# Patient Record
Sex: FEMALE | Race: WHITE | NOT HISPANIC OR LATINO | URBAN - METROPOLITAN AREA
[De-identification: names, ages, dates, MRNs, and addresses within clinical notes are randomized per-mention and may not be internally consistent; named-entity substitution may affect disease eponyms.]

---

## 2020-01-28 ENCOUNTER — APPOINTMENT (OUTPATIENT)
Age: 50
Setting detail: DERMATOLOGY
End: 2020-03-23

## 2020-01-28 VITALS
HEART RATE: 84 BPM | HEIGHT: 65 IN | OXYGEN SATURATION: 97 % | SYSTOLIC BLOOD PRESSURE: 134 MMHG | WEIGHT: 181 LBS | RESPIRATION RATE: 17 BRPM | DIASTOLIC BLOOD PRESSURE: 75 MMHG

## 2020-01-28 DIAGNOSIS — Z80.3 FAMILY HISTORY OF MALIGNANT NEOPLASM OF BREAST: ICD-10-CM

## 2020-01-28 PROCEDURE — OTHER COUNSELING - BREAST CANCER (ONC): OTHER

## 2020-01-28 PROCEDURE — 99202 OFFICE O/P NEW SF 15 MIN: CPT

## 2020-01-28 ASSESSMENT — LOCATION DETAILED DESCRIPTION DERM
LOCATION DETAILED: RIGHT MEDIAL BREAST 3-4:00 REGION
LOCATION DETAILED: LEFT MEDIAL BREAST 9-10:00 REGION

## 2020-01-28 ASSESSMENT — LOCATION SIMPLE DESCRIPTION DERM
LOCATION SIMPLE: RIGHT BREAST
LOCATION SIMPLE: LEFT BREAST

## 2020-01-28 ASSESSMENT — LOCATION ZONE DERM: LOCATION ZONE: TRUNK

## 2020-01-28 NOTE — HPI: BREAST RECONSTRUCTION CONSULTATION
What Is Your Pre-Diagnosis Bra Size (Numeric)?: 38
How Severe Are Your Concerns?: mild
Is This A New Presentation, Or A Follow-Up?: Breast Reconstruction Consultation
Who Is Your Breast Surgeon (If Known)?: Dr. Collins
Who Referred You?: Dr. Fontenot

## 2020-06-01 ENCOUNTER — RX ONLY (RX ONLY)
Age: 50
End: 2020-06-01

## 2020-06-01 ENCOUNTER — APPOINTMENT (OUTPATIENT)
Age: 50
Setting detail: DERMATOLOGY
End: 2020-06-01

## 2020-06-01 DIAGNOSIS — Z42.1 ENCOUNTER FOR BREAST RECONSTRUCTION FOLLOWING MASTECTOMY: ICD-10-CM

## 2020-06-01 PROCEDURE — 99212 OFFICE O/P EST SF 10 MIN: CPT

## 2020-06-01 PROCEDURE — OTHER PRE-OP WORKLIST: OTHER

## 2020-06-01 PROCEDURE — OTHER COUNSELING - POSTMASTECTOMY BREAST RECONSTRUCTION: OTHER

## 2020-06-01 PROCEDURE — OTHER PHOTOS OBTAINED: OTHER

## 2020-06-01 NOTE — PROCEDURE: PHOTOS OBTAINED
Follow-Up For Additional Photos?: no
Detail Level: Detailed
Photographed Locations: Photos were obtained of the surgical site(s).
Follow-Up Increment: 1
Follow-Up Interval: year

## 2020-06-01 NOTE — PROCEDURE: PRE-OP WORKLIST
Surgery Scheduled: Bilateral direct to implant versus TE with alloderm; lifted smaller to a C-
Surgeon: Karina
Photos Taken?: yes
Admission Status: outpatient
Detail Level: Zone
Date Of Surgery: 6/29/20
Coordination With:: Dr. Fontenot

## 2020-07-06 ENCOUNTER — APPOINTMENT (OUTPATIENT)
Age: 50
Setting detail: DERMATOLOGY
End: 2020-07-07

## 2020-07-06 DIAGNOSIS — Z48.89 ENCOUNTER FOR OTHER SPECIFIED SURGICAL AFTERCARE: ICD-10-CM

## 2020-07-06 DIAGNOSIS — Z90.1 ACQUIRED ABSENCE OF BREAST AND NIPPLE: ICD-10-CM

## 2020-07-06 PROBLEM — Z90.10 ACQUIRED ABSENCE OF UNSPECIFIED BREAST AND NIPPLE: Status: ACTIVE | Noted: 2020-07-06

## 2020-07-06 PROCEDURE — OTHER CODE 99024 - NO CHARGE POST-OP VISIT: OTHER

## 2020-07-06 PROCEDURE — OTHER COUNSELING - POST-OP CHECK: OTHER

## 2020-07-06 PROCEDURE — 99024 POSTOP FOLLOW-UP VISIT: CPT

## 2020-07-20 ENCOUNTER — APPOINTMENT (OUTPATIENT)
Age: 50
Setting detail: DERMATOLOGY
End: 2020-07-27

## 2020-07-20 DIAGNOSIS — Z90.1 ACQUIRED ABSENCE OF BREAST AND NIPPLE: ICD-10-CM

## 2020-07-20 DIAGNOSIS — Z48.89 ENCOUNTER FOR OTHER SPECIFIED SURGICAL AFTERCARE: ICD-10-CM

## 2020-07-20 PROBLEM — Z90.10 ACQUIRED ABSENCE OF UNSPECIFIED BREAST AND NIPPLE: Status: ACTIVE | Noted: 2020-07-20

## 2020-07-20 PROCEDURE — OTHER TISSUE EXPANDER FILL: OTHER

## 2020-07-20 PROCEDURE — OTHER COUNSELING - POST-OP CHECK: OTHER

## 2020-07-20 PROCEDURE — 99024 POSTOP FOLLOW-UP VISIT: CPT

## 2020-07-20 PROCEDURE — OTHER CODE 99024 - NO CHARGE POST-OP VISIT: OTHER

## 2020-07-20 ASSESSMENT — LOCATION SIMPLE DESCRIPTION DERM
LOCATION SIMPLE: LEFT BREAST
LOCATION SIMPLE: RIGHT BREAST

## 2020-07-20 ASSESSMENT — LOCATION ZONE DERM: LOCATION ZONE: TRUNK

## 2020-07-20 ASSESSMENT — LOCATION DETAILED DESCRIPTION DERM
LOCATION DETAILED: LEFT PERIAREOLAR BREAST 12-1:00 REGION
LOCATION DETAILED: RIGHT PERIAREOLAR BREAST 11-12:00 REGION

## 2020-07-20 NOTE — PROCEDURE: TISSUE EXPANDER FILL
Postcare: The injection site was hemostatic. A small bandaid was applied to the injection site. There were no apparent complications.
Detail Level: Simple
Tissue Expander Text: The tissue expander port site was identified. The skin overlying the port site was sterilely cleansed. The needle was introduced into the expander, entry was confirmed by drawback on the plunger.
Starting Volume (Cc): 400
Wound care was instituted after removal of dressings. Surgical site inspection was performed, incisions cleaned and dressings were changed.
Filled With: normal saline
Volume Added (Cc): 100

## 2020-07-28 ENCOUNTER — APPOINTMENT (OUTPATIENT)
Age: 50
Setting detail: DERMATOLOGY
End: 2020-08-04

## 2020-07-28 DIAGNOSIS — L0391 CELLULITIS AND ABSCESS OF UNSPECIFIED SITES: ICD-10-CM

## 2020-07-28 DIAGNOSIS — L0390 CELLULITIS AND ABSCESS OF UNSPECIFIED SITES: ICD-10-CM

## 2020-07-28 DIAGNOSIS — Z48.89 ENCOUNTER FOR OTHER SPECIFIED SURGICAL AFTERCARE: ICD-10-CM

## 2020-07-28 PROBLEM — L03.319 CELLULITIS OF TRUNK, UNSPECIFIED: Status: ACTIVE | Noted: 2020-07-28

## 2020-07-28 PROCEDURE — OTHER ORDER TESTS: OTHER

## 2020-07-28 PROCEDURE — OTHER COUNSELING - CELLULITIS: OTHER

## 2020-07-28 PROCEDURE — OTHER TISSUE EXPANDER FILL: OTHER

## 2020-07-28 PROCEDURE — OTHER OTHER: OTHER

## 2020-07-28 PROCEDURE — OTHER PRESCRIPTION: OTHER

## 2020-07-28 RX ORDER — SULFAMETHOXAZOLE AND TRIMETHOPRIM 800; 160 MG/1; MG/1
TABLET ORAL BID
Qty: 20 | Refills: 0 | Status: ERX | COMMUNITY
Start: 2020-07-28

## 2020-07-28 ASSESSMENT — LOCATION SIMPLE DESCRIPTION DERM: LOCATION SIMPLE: RIGHT BREAST

## 2020-07-28 ASSESSMENT — LOCATION ZONE DERM: LOCATION ZONE: TRUNK

## 2020-07-28 ASSESSMENT — LOCATION DETAILED DESCRIPTION DERM: LOCATION DETAILED: RIGHT PERIAREOLAR BREAST 5-6:00 REGION

## 2020-07-28 NOTE — PROCEDURE: TISSUE EXPANDER FILL
Wound care was instituted after removal of dressings. Surgical site inspection was performed, incisions cleaned and dressings were changed.
Detail Level: Simple
Filled With: normal saline
Starting Volume (Cc): 500
Postcare: The injection site was hemostatic. A small bandaid was applied to the injection site. There were no apparent complications.
Tissue Expander Text: The tissue expander port site was identified. The skin overlying the port site was sterilely cleansed. The needle was introduced into the expander, entry was confirmed by drawback on the plunger.
Volume Added (Cc): 50

## 2020-08-04 ENCOUNTER — APPOINTMENT (OUTPATIENT)
Age: 50
Setting detail: DERMATOLOGY
End: 2020-08-17

## 2020-08-04 DIAGNOSIS — Z48.89 ENCOUNTER FOR OTHER SPECIFIED SURGICAL AFTERCARE: ICD-10-CM

## 2020-08-04 DIAGNOSIS — Z90.1 ACQUIRED ABSENCE OF BREAST AND NIPPLE: ICD-10-CM

## 2020-08-04 PROBLEM — Z90.10 ACQUIRED ABSENCE OF UNSPECIFIED BREAST AND NIPPLE: Status: ACTIVE | Noted: 2020-08-04

## 2020-08-04 PROCEDURE — OTHER COUNSELING - POST-OP CHECK: OTHER

## 2020-08-04 PROCEDURE — OTHER TISSUE EXPANDER FILL: OTHER

## 2020-08-04 PROCEDURE — 99024 POSTOP FOLLOW-UP VISIT: CPT

## 2020-08-04 PROCEDURE — OTHER CODE 99024 - NO CHARGE POST-OP VISIT: OTHER

## 2020-08-04 ASSESSMENT — LOCATION ZONE DERM: LOCATION ZONE: TRUNK

## 2020-08-04 ASSESSMENT — LOCATION DETAILED DESCRIPTION DERM
LOCATION DETAILED: RIGHT PERIAREOLAR BREAST 11-12:00 REGION
LOCATION DETAILED: LEFT PERIAREOLAR BREAST 12-1:00 REGION

## 2020-08-04 ASSESSMENT — LOCATION SIMPLE DESCRIPTION DERM
LOCATION SIMPLE: LEFT BREAST
LOCATION SIMPLE: RIGHT BREAST

## 2020-08-04 NOTE — PROCEDURE: TISSUE EXPANDER FILL
Filled With: normal saline
Starting Volume (Cc): 550
Volume Added (Cc): 50
Postcare: The injection site was hemostatic. A small bandaid was applied to the injection site. There were no apparent complications.
Detail Level: Simple
Volume Added (Cc): 100
Tissue Expander Text: The tissue expander port site was identified. The skin overlying the port site was sterilely cleansed. The needle was introduced into the expander, entry was confirmed by drawback on the plunger.
Wound care was instituted after removal of dressings. Surgical site inspection was performed, incisions cleaned and dressings were changed.
Starting Volume (Cc): 500

## 2020-09-30 ENCOUNTER — RX ONLY (RX ONLY)
Age: 50
End: 2020-09-30

## 2020-09-30 RX ORDER — SULFAMETHOXAZOLE AND TRIMETHOPRIM 800; 160 MG/1; MG/1
TABLET ORAL
Qty: 10 | Refills: 0 | Status: ERX

## 2020-10-06 ENCOUNTER — APPOINTMENT (OUTPATIENT)
Age: 50
Setting detail: DERMATOLOGY
End: 2020-10-06

## 2020-10-06 DIAGNOSIS — Z48.89 ENCOUNTER FOR OTHER SPECIFIED SURGICAL AFTERCARE: ICD-10-CM

## 2020-10-06 DIAGNOSIS — Z90.1 ACQUIRED ABSENCE OF BREAST AND NIPPLE: ICD-10-CM

## 2020-10-06 PROBLEM — Z90.10 ACQUIRED ABSENCE OF UNSPECIFIED BREAST AND NIPPLE: Status: ACTIVE | Noted: 2020-10-06

## 2020-10-06 PROCEDURE — 99024 POSTOP FOLLOW-UP VISIT: CPT

## 2020-10-06 PROCEDURE — OTHER COUNSELING - POST-OP CHECK: OTHER

## 2020-10-06 PROCEDURE — OTHER CODE 99024 - NO CHARGE POST-OP VISIT: OTHER

## 2020-10-27 ENCOUNTER — APPOINTMENT (OUTPATIENT)
Age: 50
Setting detail: DERMATOLOGY
End: 2020-10-29

## 2020-10-27 DIAGNOSIS — Z48.89 ENCOUNTER FOR OTHER SPECIFIED SURGICAL AFTERCARE: ICD-10-CM

## 2020-10-27 DIAGNOSIS — Z90.1 ACQUIRED ABSENCE OF BREAST AND NIPPLE: ICD-10-CM

## 2020-10-27 PROBLEM — Z90.10 ACQUIRED ABSENCE OF UNSPECIFIED BREAST AND NIPPLE: Status: ACTIVE | Noted: 2020-10-27

## 2020-10-27 PROCEDURE — OTHER COUNSELING - POST-OP CHECK: OTHER

## 2020-10-27 PROCEDURE — 99024 POSTOP FOLLOW-UP VISIT: CPT

## 2020-10-27 PROCEDURE — OTHER CODE 99024 - NO CHARGE POST-OP VISIT: OTHER
